# Patient Record
Sex: FEMALE
[De-identification: names, ages, dates, MRNs, and addresses within clinical notes are randomized per-mention and may not be internally consistent; named-entity substitution may affect disease eponyms.]

---

## 2023-10-14 ENCOUNTER — NURSE TRIAGE (OUTPATIENT)
Dept: OTHER | Facility: CLINIC | Age: 63
End: 2023-10-14

## 2023-10-14 NOTE — TELEPHONE ENCOUNTER
Location of patient: [place]    Clinic or Physician Name: Kaycee Chan\"     Pt needs refills of her Diazepam and Prozac sent to [address] [place] [address] at [address] [facility]. [place], [place]. She has 1 day left of the Diazepam and will run out before office opens on Monday. Paged on call provider Dr. Jes Francois at 2:37 pm for refills. Instructed pt to call back in 30 minutes if she has not heard from provider. Received communication back from Dr. Jes Francois at 2:39 pm stating we cannot refill controlled medications after hours. Attempted to call pt back x3 to notify her but did not get an answer. Left a voicemail for pt to call back.     Reason for Disposition   Caller requesting a CONTROLLED substance prescription refill (e.g., narcotics, ADHD medicines)    Protocols used: Medication Refill and Renewal Call-ADULT-

## 2023-10-14 NOTE — TELEPHONE ENCOUNTER
Received a call back from [place] at this time and notified her that no controlled prescriptions can be refilled after hours. I told her that the office will call her by 10 am Monday morning to get her refills taken care of.